# Patient Record
Sex: FEMALE | Race: WHITE
[De-identification: names, ages, dates, MRNs, and addresses within clinical notes are randomized per-mention and may not be internally consistent; named-entity substitution may affect disease eponyms.]

---

## 2017-10-29 ENCOUNTER — HEALTH MAINTENANCE LETTER (OUTPATIENT)
Age: 32
End: 2017-10-29

## 2018-11-15 ENCOUNTER — OFFICE VISIT - RIVER FALLS (OUTPATIENT)
Dept: FAMILY MEDICINE | Facility: CLINIC | Age: 33
End: 2018-11-15
Payer: MEDICAID

## 2018-11-15 ASSESSMENT — MIFFLIN-ST. JEOR: SCORE: 1714.52

## 2020-03-01 ENCOUNTER — HEALTH MAINTENANCE LETTER (OUTPATIENT)
Age: 35
End: 2020-03-01

## 2020-12-14 ENCOUNTER — HEALTH MAINTENANCE LETTER (OUTPATIENT)
Age: 35
End: 2020-12-14

## 2021-04-17 ENCOUNTER — HEALTH MAINTENANCE LETTER (OUTPATIENT)
Age: 36
End: 2021-04-17

## 2021-10-02 ENCOUNTER — HEALTH MAINTENANCE LETTER (OUTPATIENT)
Age: 36
End: 2021-10-02

## 2022-02-12 VITALS — HEIGHT: 64 IN | WEIGHT: 229.4 LBS | BODY MASS INDEX: 39.16 KG/M2

## 2022-02-16 NOTE — PROGRESS NOTES
Patient:   LINNEA MUNOZ            MRN: [id number]            FIN: [id number]               Age:   33 years     Sex:  Female     :  1985   Associated Diagnoses:   Obesity; Prediabetes   Author:   Estrellita Brennan      Visit Information   Visit type:  Medical Nutrition Therapy.    Referral source:  Rosario MAKI, Al, Dr. Cox [facility]  .       Chief Complaint   Pre- Diabetes, Obesity       Interval History   Pt with hx of GDM not requiring medication management  Hgb A1C 6% 18 - prediabetes up from 5.7% 2016    Pt is in nursing school, has 2 children at home, .  Stress level is high and does not always find time for herself.    Pt is on phentermine 37.5mg x ~ 4mo and would like to transition to a different medication.      Intake: pt states she knows she loves carbs  am cereal or toaster pastry x 2  noon 2 sl ww bread sandwich, fruit  snack occ unhealthy but often string cheese, turkey sticks   evening protein, vegetables     Exercise: no routine   Sleep: poor, son sleeps with her, 2 cats, 2 dogs, , restless, light sleeper         Health Status   Allergies:    Allergic Reactions (Selected)  Severity Not Documented  Latex (No reactions were documented)  Sulfa drugs (No reactions were documented)   Medications:  (Selected)   Documented Medications  Documented  ACETAMINOPHEN-HYDROCODONE 325 MG-5 MG ORAL TABLET ([id number]): 1 tab(s), po, q6 hrs, Instructions: (not to exceed 4000 mg acetaminophen per day), # 30 tab(s), 0 Refill(s)  ADVAIR DISKUS 250 MCG-50 MCG INHALATION POWDER ([id number]): 1 puff(s), inh, 0 Refill(s)  PREVACID 30 MG ORAL DELAYED RELEASE CAPSULE ([id number]): ( 30 mg ), po, # 90 cap(s), 1 Refill(s)  TRAMADOL 50 MG ORAL TABLET ([id number]): UNKNOWNUNIT, Not Listed, Instructions: 0.5-1 tab(s) PO q6 hrs, # 40 tab(s), 0 Refill(s)   Problem list:    All Problems (Selected)  Undiagnosed Cardiac Murmurs / ICD-9-.2 / Confirmed  Obesity, Unspecified / ICD-9-.00 /  Probable  Asthma, Unspecified / ICD-9-.90 / Confirmed  Allergic Rhinitis, Cause Unspecified / ICD-9-.9 / Confirmed      Histories   Past Medical History:    Resolved  Major Depressive Disorder, Single Episode, Severe Degree, without Mention of Psychotic Behavior (296.23):  Resolved.   Family History:    No family history items have been selected or recorded.   Procedure history:    No active procedure history items have been selected or recorded.   Social History:             No active social history items have been recorded.      Physical Examination   Measurements from flowsheet : Measurements   11/15/2018 2:04 PM CST Height Measured - Standard 64.25 in    Weight Measured - Standard 229.4 lb    BSA 2.17 m2    Body Mass Index 39.07 kg/m2  HI         Impression and Plan   Diagnosis     Obesity (MUU69-ES E66.9).     Prediabetes (NWS06-QC R73.03).         Today the patient is instructed on the basic physiology of prediabetes - we discussed insulin resistance and the role it has in preventing the potential of developing type 2 diabetes mellitus in the future.  Goals to reduce insulin resistance include increasing physical activity and decreasing weight.  Pt was given instructions on the effects of a healthy lifestyle including sleep, stress, food, physical activity and how they relate overall to being healthy and having good control of blood glucose levels, lipid values, and blood pressure.     Pts weight loss goal of 7 - 10% of current body weight 22 pounds as a reasonable goal to help increase insulin sensitivity.    Healthy Eating - Education on what foods are primary sources of carbohydrates and how intake affects BG readings, edu on carbohydrate counting, reading food labels, appropriate portion sizes, well balanced meals, diabetic plate method as a general rule.  Patient is provided with ideas to incorporate dietary recommendations into meal planning, weight loss tips and mindful eating.  Patient is  provided with 7 day menu.  Being Active - Education on how exercise helps with :    - lowering BG by increasing the muscles ability to take up and use glucose   - weight loss   - healthier heart (improve lipid profile)   - improve sleep, mood, energy   - decrease stress      Monitoring - A1C yearly or prn   Taking Medication -   Today the patient is instructed on Saxenda: proper use, mechanism of action, indications, dosing, injection schedule, safety and side effects.  Pt is shown a demonstration of the use of the pen and was able to return demonstration without difficulty.   Dose Escalation Schedule   Week  Daily Dose    1  0.6 mg    2  1.2 mg    3  1.8 mg    4  2.4 mg    5 and onward  3 mg      Written materials were provided for further references and reviewed in detail.       Goals:   1.  Practice healthy stress management and get good quality sleep with the goal of 7-8 hours per night.    2.  Increase physical activity and make this a part of a daily routine.  Recommend 30 minutes of moderately intense physical activity 5 or more days per week.  Stay physically active on a daily basis and throughout the year.  Recommend a pedometer; gradually increase the average to a minimum of 6000 steps with the ultimate goal of 10,000 steps per day.  Exercise goal is to continue increasing the time and intensity of his physical activity incorporating 30 minutes of moderately intense exercise 5+ days per week   3.  Eat in a healthy way, per diabetic plate method.  Keep a food record.  Nutrition reference: Eat 3 meals a day; snacks are optional.  A meal is three or more food groups; make it colorful for better nutrition.  Incorporate TLC dietary heart healthy guidelines.    4.  Total Carbohydrate intake Breakfast 30 grams, snacks (optional) < 15 grams  5.    Goal weight ~195 by 6/2019  6.  Read handouts provided.  Wean off Phentermine, start Saxenda per dose escalation scale.        Professional Services   Time spent with pt  60 min   cc Dr. Ponce   cc Dr. Cox - [facility]

## 2022-05-13 ENCOUNTER — LAB REQUISITION (OUTPATIENT)
Dept: LAB | Facility: CLINIC | Age: 37
End: 2022-05-13
Payer: COMMERCIAL

## 2022-05-13 PROCEDURE — U0003 INFECTIOUS AGENT DETECTION BY NUCLEIC ACID (DNA OR RNA); SEVERE ACUTE RESPIRATORY SYNDROME CORONAVIRUS 2 (SARS-COV-2) (CORONAVIRUS DISEASE [COVID-19]), AMPLIFIED PROBE TECHNIQUE, MAKING USE OF HIGH THROUGHPUT TECHNOLOGIES AS DESCRIBED BY CMS-2020-01-R: HCPCS | Performed by: FAMILY MEDICINE

## 2022-05-14 ENCOUNTER — HEALTH MAINTENANCE LETTER (OUTPATIENT)
Age: 37
End: 2022-05-14

## 2022-05-14 LAB — SARS-COV-2 RNA RESP QL NAA+PROBE: NEGATIVE

## 2022-05-16 ENCOUNTER — LAB REQUISITION (OUTPATIENT)
Dept: LAB | Facility: CLINIC | Age: 37
End: 2022-05-16
Payer: COMMERCIAL

## 2022-05-16 DIAGNOSIS — Z03.818 ENCOUNTER FOR OBSERVATION FOR SUSPECTED EXPOSURE TO OTHER BIOLOGICAL AGENTS RULED OUT: ICD-10-CM

## 2022-05-18 PROCEDURE — U0003 INFECTIOUS AGENT DETECTION BY NUCLEIC ACID (DNA OR RNA); SEVERE ACUTE RESPIRATORY SYNDROME CORONAVIRUS 2 (SARS-COV-2) (CORONAVIRUS DISEASE [COVID-19]), AMPLIFIED PROBE TECHNIQUE, MAKING USE OF HIGH THROUGHPUT TECHNOLOGIES AS DESCRIBED BY CMS-2020-01-R: HCPCS | Performed by: FAMILY MEDICINE

## 2022-05-20 LAB — SARS-COV-2 RNA RESP QL NAA+PROBE: NEGATIVE

## 2022-06-08 ENCOUNTER — LAB REQUISITION (OUTPATIENT)
Dept: LAB | Facility: CLINIC | Age: 37
End: 2022-06-08
Payer: COMMERCIAL

## 2022-06-08 DIAGNOSIS — Z03.818 ENCOUNTER FOR OBSERVATION FOR SUSPECTED EXPOSURE TO OTHER BIOLOGICAL AGENTS RULED OUT: ICD-10-CM

## 2022-06-13 PROCEDURE — U0005 INFEC AGEN DETEC AMPLI PROBE: HCPCS | Mod: ORL | Performed by: FAMILY MEDICINE

## 2022-06-14 LAB — SARS-COV-2 RNA RESP QL NAA+PROBE: NEGATIVE

## 2022-06-16 ENCOUNTER — LAB REQUISITION (OUTPATIENT)
Dept: LAB | Facility: CLINIC | Age: 37
End: 2022-06-16
Payer: COMMERCIAL

## 2022-06-16 DIAGNOSIS — Z03.818 ENCOUNTER FOR OBSERVATION FOR SUSPECTED EXPOSURE TO OTHER BIOLOGICAL AGENTS RULED OUT: ICD-10-CM

## 2022-06-20 PROCEDURE — U0003 INFECTIOUS AGENT DETECTION BY NUCLEIC ACID (DNA OR RNA); SEVERE ACUTE RESPIRATORY SYNDROME CORONAVIRUS 2 (SARS-COV-2) (CORONAVIRUS DISEASE [COVID-19]), AMPLIFIED PROBE TECHNIQUE, MAKING USE OF HIGH THROUGHPUT TECHNOLOGIES AS DESCRIBED BY CMS-2020-01-R: HCPCS | Mod: ORL | Performed by: FAMILY MEDICINE

## 2022-06-21 LAB — SARS-COV-2 RNA RESP QL NAA+PROBE: NEGATIVE

## 2022-06-23 ENCOUNTER — LAB REQUISITION (OUTPATIENT)
Dept: LAB | Facility: CLINIC | Age: 37
End: 2022-06-23
Payer: COMMERCIAL

## 2022-06-23 DIAGNOSIS — Z03.818 ENCOUNTER FOR OBSERVATION FOR SUSPECTED EXPOSURE TO OTHER BIOLOGICAL AGENTS RULED OUT: ICD-10-CM

## 2022-06-27 PROCEDURE — U0003 INFECTIOUS AGENT DETECTION BY NUCLEIC ACID (DNA OR RNA); SEVERE ACUTE RESPIRATORY SYNDROME CORONAVIRUS 2 (SARS-COV-2) (CORONAVIRUS DISEASE [COVID-19]), AMPLIFIED PROBE TECHNIQUE, MAKING USE OF HIGH THROUGHPUT TECHNOLOGIES AS DESCRIBED BY CMS-2020-01-R: HCPCS | Mod: ORL | Performed by: FAMILY MEDICINE

## 2022-06-28 LAB — SARS-COV-2 RNA RESP QL NAA+PROBE: NEGATIVE

## 2022-06-30 ENCOUNTER — LAB REQUISITION (OUTPATIENT)
Dept: LAB | Facility: CLINIC | Age: 37
End: 2022-06-30
Payer: COMMERCIAL

## 2022-06-30 DIAGNOSIS — Z03.818 ENCOUNTER FOR OBSERVATION FOR SUSPECTED EXPOSURE TO OTHER BIOLOGICAL AGENTS RULED OUT: ICD-10-CM

## 2022-07-04 PROCEDURE — U0003 INFECTIOUS AGENT DETECTION BY NUCLEIC ACID (DNA OR RNA); SEVERE ACUTE RESPIRATORY SYNDROME CORONAVIRUS 2 (SARS-COV-2) (CORONAVIRUS DISEASE [COVID-19]), AMPLIFIED PROBE TECHNIQUE, MAKING USE OF HIGH THROUGHPUT TECHNOLOGIES AS DESCRIBED BY CMS-2020-01-R: HCPCS | Mod: ORL | Performed by: FAMILY MEDICINE

## 2022-07-06 LAB — SARS-COV-2 RNA RESP QL NAA+PROBE: NEGATIVE

## 2022-07-11 ENCOUNTER — LAB REQUISITION (OUTPATIENT)
Dept: LAB | Facility: CLINIC | Age: 37
End: 2022-07-11
Payer: COMMERCIAL

## 2022-07-11 DIAGNOSIS — Z03.818 ENCOUNTER FOR OBSERVATION FOR SUSPECTED EXPOSURE TO OTHER BIOLOGICAL AGENTS RULED OUT: ICD-10-CM

## 2022-07-11 PROCEDURE — U0003 INFECTIOUS AGENT DETECTION BY NUCLEIC ACID (DNA OR RNA); SEVERE ACUTE RESPIRATORY SYNDROME CORONAVIRUS 2 (SARS-COV-2) (CORONAVIRUS DISEASE [COVID-19]), AMPLIFIED PROBE TECHNIQUE, MAKING USE OF HIGH THROUGHPUT TECHNOLOGIES AS DESCRIBED BY CMS-2020-01-R: HCPCS | Mod: ORL | Performed by: FAMILY MEDICINE

## 2022-07-12 LAB — SARS-COV-2 RNA RESP QL NAA+PROBE: NEGATIVE

## 2023-01-14 ENCOUNTER — HEALTH MAINTENANCE LETTER (OUTPATIENT)
Age: 38
End: 2023-01-14

## 2023-06-02 ENCOUNTER — HEALTH MAINTENANCE LETTER (OUTPATIENT)
Age: 38
End: 2023-06-02

## 2024-10-12 ENCOUNTER — TRANSFERRED RECORDS (OUTPATIENT)
Dept: HEALTH INFORMATION MANAGEMENT | Facility: CLINIC | Age: 39
End: 2024-10-12

## 2024-11-25 ENCOUNTER — MEDICAL CORRESPONDENCE (OUTPATIENT)
Dept: HEALTH INFORMATION MANAGEMENT | Facility: CLINIC | Age: 39
End: 2024-11-25
Payer: COMMERCIAL

## 2024-11-27 ENCOUNTER — TRANSCRIBE ORDERS (OUTPATIENT)
Dept: OTHER | Age: 39
End: 2024-11-27

## 2024-11-27 DIAGNOSIS — M54.9 CHRONIC BACK PAIN: Primary | ICD-10-CM

## 2024-11-27 DIAGNOSIS — M54.16 LUMBAR RADICULOPATHY: ICD-10-CM

## 2024-11-27 DIAGNOSIS — G89.29 CHRONIC HEADACHES: ICD-10-CM

## 2024-11-27 DIAGNOSIS — M46.1 SACROILIITIS (H): ICD-10-CM

## 2024-11-27 DIAGNOSIS — R51.9 CHRONIC HEADACHES: ICD-10-CM

## 2024-11-27 DIAGNOSIS — G43.109 MIGRAINE WITH AURA: ICD-10-CM

## 2024-11-27 DIAGNOSIS — Z80.3 FAMILY HISTORY OF BREAST CANCER IN MOTHER: ICD-10-CM

## 2024-11-27 DIAGNOSIS — G89.29 CHRONIC BACK PAIN: Primary | ICD-10-CM

## 2024-12-30 NOTE — LETTER
(Inserted Image. Unable to display)   February 21, 2019      LINNEA TAMMY  [address] [place] [address] [address]  [place], [place] [id number]        Dear LINNEA,      Thank you for selecting [facility] (previously [facility]) for your healthcare needs.     Our records indicate you are due for the following services:     Your Healthcare Provider has recommended that you schedule an appointment with the registered dietitian, Estrellita Brennan, RD, CD, CDE.     Available services include:  - Medical nutrition therapy for a variety of chronic health conditions (hyperlipidemia, obesity, metabolic syndrome, pre-diabetes, hypertension, kidney disease, celiac disease, food allergies, and malnutrition.    - Nutritional education guides including: mindful-based eating practices, portion control, reading food labels and meal planning  - Motivational tools to help set and achieve personal health goals  - Weight loss strategies  - Risk reduction of chronic and acute health complications through healthy lifestyle choices  - Strategies to incorporate healthy eating and physical activity into everyday life    To schedule an appointment or if you have further questions, please contact your primary clinic:   [facility]       [phone]   [facility]       [phone]              [facility]     [phone]      Powered by Health and [employer]    Sincerely,    Estrellita Brennan R.D., C.D., C.D.E.   Yes

## 2025-03-08 ENCOUNTER — HEALTH MAINTENANCE LETTER (OUTPATIENT)
Age: 40
End: 2025-03-08